# Patient Record
Sex: FEMALE | Race: WHITE | NOT HISPANIC OR LATINO | URBAN - NONMETROPOLITAN AREA
[De-identification: names, ages, dates, MRNs, and addresses within clinical notes are randomized per-mention and may not be internally consistent; named-entity substitution may affect disease eponyms.]

---

## 2018-12-28 PROBLEM — H02.88B: Noted: 2020-01-07

## 2018-12-28 PROBLEM — Z96.1: Noted: 2020-01-07

## 2018-12-28 PROBLEM — H35.361: Noted: 2020-01-07

## 2018-12-28 PROBLEM — H01.024: Noted: 2020-01-07

## 2018-12-28 PROBLEM — H01.021: Noted: 2020-01-07

## 2018-12-28 PROBLEM — H16.223: Noted: 2018-12-28

## 2018-12-28 PROBLEM — H02.88A: Noted: 2020-01-07

## 2020-01-07 ENCOUNTER — IMPORTED ENCOUNTER (OUTPATIENT)
Dept: URBAN - NONMETROPOLITAN AREA CLINIC 1 | Facility: CLINIC | Age: 75
End: 2020-01-07

## 2020-01-07 PROBLEM — H35.363: Noted: 2020-01-07

## 2020-01-07 PROBLEM — Z96.1: Noted: 2020-01-07

## 2020-01-07 PROBLEM — H16.223: Noted: 2018-12-28

## 2020-01-07 PROCEDURE — 92015 DETERMINE REFRACTIVE STATE: CPT

## 2020-01-07 PROCEDURE — 92014 COMPRE OPH EXAM EST PT 1/>: CPT

## 2020-01-07 NOTE — PATIENT DISCUSSION
Dry Eye Syndrome/MGD/Bleph-  discussed findings with pt -  start hot compresses-  start Avenova QD OU from [employer] -  continue Refresh BID-TID OU-  start FML 0.1% taper QID x 1 week TID x 1 week BID x 1 week QD x 1 week then d/c -  consider starting Xiidra or Restasis in the future-  RTC 6 month f/u or prn ARMD OU-  disucssed findings with pt-  consider AREDS 2 MVT and AG use -  patient needs OCT mac -  monitor 6 month f/u w/OCT Mac; 's Notes: corneal thickness adjustment 1.5 ouMR 1/7/2020DFE 12/11/18OCT mac

## 2020-12-03 ENCOUNTER — IMPORTED ENCOUNTER (OUTPATIENT)
Dept: URBAN - NONMETROPOLITAN AREA CLINIC 1 | Facility: CLINIC | Age: 75
End: 2020-12-03

## 2020-12-03 PROCEDURE — 92134 CPTRZ OPH DX IMG PST SGM RTA: CPT

## 2020-12-03 PROCEDURE — 99213 OFFICE O/P EST LOW 20 MIN: CPT

## 2022-04-09 ASSESSMENT — TONOMETRY
OS_IOP_MMHG: 14
OD_IOP_MMHG: 14
OS_IOP_MMHG: 14
OD_IOP_MMHG: 14

## 2022-04-09 ASSESSMENT — VISUAL ACUITY
OU_CC: 20/25+3
OS_SC: 20/30-1
OS_SC: 20/30
OD_SC: 20/30+
OD_SC: 20/30

## 2022-04-09 ASSESSMENT — PACHYMETRY
OD_CT_UM: 516; ADJ: THIN
OS_CT_UM: 516; ADJ: THIN
OS_CT_UM: 516; ADJ: THIN
OD_CT_UM: 516; ADJ: THIN

## 2022-04-19 ENCOUNTER — COMPREHENSIVE EXAM (OUTPATIENT)
Dept: URBAN - NONMETROPOLITAN AREA CLINIC 4 | Facility: CLINIC | Age: 77
End: 2022-04-19

## 2022-04-19 DIAGNOSIS — H16.223: ICD-10-CM

## 2022-04-19 DIAGNOSIS — H35.361: ICD-10-CM

## 2022-04-19 PROCEDURE — 99214 OFFICE O/P EST MOD 30 MIN: CPT

## 2022-04-19 RX ORDER — CARBOXYMETHYLCELLULOSE SODIUM AND GLYCERIN 5; 9 MG/ML; MG/ML: 1 SOLUTION/ DROPS OPHTHALMIC EVERY MORNING

## 2022-04-19 ASSESSMENT — VISUAL ACUITY
OS_CC: 20/30
OU_CC: J1+
OD_CC: 20/25-2
OU_CC: 20/20

## 2022-04-19 ASSESSMENT — TONOMETRY
OD_IOP_MMHG: 15
OS_IOP_MMHG: 15

## 2023-07-27 ENCOUNTER — COMPREHENSIVE EXAM (OUTPATIENT)
Dept: URBAN - NONMETROPOLITAN AREA CLINIC 4 | Facility: CLINIC | Age: 78
End: 2023-07-27

## 2023-07-27 DIAGNOSIS — H16.223: ICD-10-CM

## 2023-07-27 DIAGNOSIS — H01.021: ICD-10-CM

## 2023-07-27 DIAGNOSIS — H35.361: ICD-10-CM

## 2023-07-27 DIAGNOSIS — Z96.1: ICD-10-CM

## 2023-07-27 DIAGNOSIS — H01.024: ICD-10-CM

## 2023-07-27 DIAGNOSIS — Z98.890: ICD-10-CM

## 2023-07-27 PROCEDURE — 92015 DETERMINE REFRACTIVE STATE: CPT

## 2023-07-27 PROCEDURE — 92014 COMPRE OPH EXAM EST PT 1/>: CPT

## 2023-07-27 ASSESSMENT — VISUAL ACUITY
OS_CC: 20/25
OU_CC: 20/20
OD_CC: 20/25-2

## 2024-08-16 ENCOUNTER — COMPREHENSIVE EXAM (OUTPATIENT)
Dept: URBAN - NONMETROPOLITAN AREA CLINIC 4 | Facility: CLINIC | Age: 79
End: 2024-08-16

## 2024-08-16 DIAGNOSIS — Z98.890: ICD-10-CM

## 2024-08-16 DIAGNOSIS — H52.4: ICD-10-CM

## 2024-08-16 DIAGNOSIS — Z96.1: ICD-10-CM

## 2024-08-16 DIAGNOSIS — H16.223: ICD-10-CM

## 2024-08-16 DIAGNOSIS — H35.363: ICD-10-CM

## 2024-08-16 DIAGNOSIS — H43.813: ICD-10-CM

## 2024-08-16 PROCEDURE — 92015 DETERMINE REFRACTIVE STATE: CPT

## 2024-08-16 PROCEDURE — 92014 COMPRE OPH EXAM EST PT 1/>: CPT

## 2024-08-16 PROCEDURE — 92134 CPTRZ OPH DX IMG PST SGM RTA: CPT

## 2024-08-16 ASSESSMENT — VISUAL ACUITY
OS_SC: 20/40
OD_CC: 20/25
OD_SC: 20/50-2
OS_CC: 20/20

## 2024-08-16 ASSESSMENT — TONOMETRY
OD_IOP_MMHG: 14
OS_IOP_MMHG: 14